# Patient Record
Sex: FEMALE | Race: BLACK OR AFRICAN AMERICAN | NOT HISPANIC OR LATINO | URBAN - METROPOLITAN AREA
[De-identification: names, ages, dates, MRNs, and addresses within clinical notes are randomized per-mention and may not be internally consistent; named-entity substitution may affect disease eponyms.]

---

## 2023-06-05 ENCOUNTER — WEB ENCOUNTER (OUTPATIENT)
Dept: URBAN - METROPOLITAN AREA CLINIC 17 | Facility: CLINIC | Age: 23
End: 2023-06-05

## 2023-06-05 ENCOUNTER — DASHBOARD ENCOUNTERS (OUTPATIENT)
Age: 23
End: 2023-06-05

## 2023-06-05 ENCOUNTER — LAB OUTSIDE AN ENCOUNTER (OUTPATIENT)
Dept: URBAN - METROPOLITAN AREA CLINIC 17 | Facility: CLINIC | Age: 23
End: 2023-06-05

## 2023-06-05 ENCOUNTER — OFFICE VISIT (OUTPATIENT)
Dept: URBAN - METROPOLITAN AREA CLINIC 17 | Facility: CLINIC | Age: 23
End: 2023-06-05
Payer: COMMERCIAL

## 2023-06-05 VITALS
HEART RATE: 78 BPM | BODY MASS INDEX: 20.31 KG/M2 | DIASTOLIC BLOOD PRESSURE: 83 MMHG | SYSTOLIC BLOOD PRESSURE: 123 MMHG | TEMPERATURE: 97.1 F | WEIGHT: 134 LBS | HEIGHT: 68 IN

## 2023-06-05 DIAGNOSIS — R19.4 CHANGE IN BOWEL HABITS: ICD-10-CM

## 2023-06-05 DIAGNOSIS — K62.89 RECTAL DISCOMFORT: ICD-10-CM

## 2023-06-05 DIAGNOSIS — K92.1 BLOOD IN STOOL: ICD-10-CM

## 2023-06-05 PROCEDURE — 99204 OFFICE O/P NEW MOD 45 MIN: CPT | Performed by: INTERNAL MEDICINE

## 2023-06-05 RX ORDER — ISOTRETINOIN 30 MG/1
AS DIRECTED CAPSULE, LIQUID FILLED ORAL
Status: ACTIVE | COMMUNITY

## 2023-06-05 RX ORDER — NORGESTIMATE AND ETHINYL ESTRADIOL 0.25-0.035
TAKE 1 TABLET BY MOUTH EVERY DAY KIT ORAL
Qty: 84 EACH | Refills: 2 | Status: ACTIVE | COMMUNITY

## 2023-06-05 RX ORDER — POLYETHYLENE GLYCOL-3350 AND ELECTROLYTES WITH FLAVOR PACK 240; 5.84; 2.98; 6.72; 22.72 G/278.26G; G/278.26G; G/278.26G; G/278.26G; G/278.26G
AS DIRECTED POWDER, FOR SOLUTION ORAL 1
Qty: 1 | Refills: 0 | OUTPATIENT
Start: 2023-06-05 | End: 2023-06-06

## 2023-06-05 NOTE — HPI-TODAY'S VISIT:
6/5/23  22 yo lady pt of [facility] -   She is self referred for blood in stool and changes in bowel habits.  She works in mental health counseling.  She has had some changes in bowel habits for 2 weeks. At first no bleeding but 1.5 weeks ago has seen blood in stool and on tissue wipe. She has rectal discomfort with evacuation Has regular daily BMs, not straining She denies constipation No fhx of colon CA.  No abdominal pain or weight loss. Acute visit for   Chief Complaint   Patient presents with   • Office Visit   • Hospital F/U     [facility] due to cat bite         Patient Active Problem List    Diagnosis Date Noted   • Cat bite of hand, right, initial encounter 04/22/2022     Priority: Low   • Migraine without status migrainosus, not intractable 01/20/2020     Priority: Low      Admitted 4/22 - 4/25    Due to swelling to her R hand after a cat bite. She went to urgent care, received a dose of iV unasyn. XR done was unrevealing. She was dsicharged on po augmentin. She was concerned about worsening pain and came to [facility] ER. Ortho was consulted. XR was unrevealign again. No indication for I&D as patient improved on IV unasyn. No sepsis criteria. ROM and erythema significantly improved on exam this morning. Can go home to complete augmentin Rx.      Of note, she reports 10 year hx of abd pain. States she was never referred to GI as pain was always dismissed by doctors and some family felt it was attention seeking behaviour. She is able to tolerate PO. She has normal BMs. Pain comes and goes. She has a benign abd exam at this time. Will refer her to [facility] for new PCP and they can initiate referral to GI outpatient. She is agreeable with this plan    Reports that he hand is doing a lot better  All of the redness has resolved and just a little bit of swelling still left but is barely noticeable  No numbness or tingling  Not having any fevers or chills  Reports that she had had a tetanus booster at urgent care this February    As far as the chronic abdominal pain it comes and goes and sometimes is gone for weeks at a time  Not identified any particular dietary triggers.  She is vegetarian but does have dairy products.  She is gone without those for long period of time and still no difference.  Just describes \"physical pain\" in the abdomen.  No persistent GERD symptoms.  Bowels are regular.  A little bit on the looser side but no blood.  Weight has been stable.  She  is interested in looking into this with GI        Current Outpatient Medications   Medication Sig Dispense Refill   • Probiotic Product (PROBIOTIC PO)      • amoxicillin-clavulanate (AUGMENTIN) 875-125 MG per tablet Take 1 tablet by mouth 2 times daily.       No current facility-administered medications for this visit.     ALLERGIES:  No Known Allergies      PE:  Visit Vitals  BP 90/60 (BP Location: LUE - Left upper extremity, Patient Position: Sitting, Cuff Size: Small Adult)   Pulse 60   Temp 98.1 °F (36.7 °C) (Temporal)   Resp 17   Ht 5' 5\" (1.651 m)   Wt 47.8 kg (105 lb 7.8 oz)   LMP 04/15/2022 (Exact Date)   SpO2 98%   BMI 17.55 kg/m²      No residual erythema or obvious swelling in the right hand.  There is still small residual bite marks around the thenar eminence on both the dorsal and palmar aspects but there is no associated drainage or fluctuance.  She is able to make a complete fist.  Normal movement of the fingers.      A/P:  1.  Right hand cellulitis status post cat bite   Has resolved nicely   Complete antibiotics as prescribed      2.  Chronic abdominal pain   Gastro referral      Electronically signed by: Rik Roberts MD, 05/04/22 10:29 AM

## 2023-06-28 ENCOUNTER — OFFICE VISIT (OUTPATIENT)
Dept: URBAN - METROPOLITAN AREA SURGERY CENTER 16 | Facility: SURGERY CENTER | Age: 23
End: 2023-06-28